# Patient Record
Sex: FEMALE | Race: OTHER | URBAN - METROPOLITAN AREA
[De-identification: names, ages, dates, MRNs, and addresses within clinical notes are randomized per-mention and may not be internally consistent; named-entity substitution may affect disease eponyms.]

---

## 2018-03-27 ENCOUNTER — EMERGENCY (EMERGENCY)
Facility: HOSPITAL | Age: 68
LOS: 1 days | Discharge: HOME | End: 2018-03-27
Attending: EMERGENCY MEDICINE | Admitting: EMERGENCY MEDICINE
Payer: MEDICARE

## 2018-03-27 VITALS
OXYGEN SATURATION: 100 % | DIASTOLIC BLOOD PRESSURE: 116 MMHG | RESPIRATION RATE: 18 BRPM | TEMPERATURE: 98 F | SYSTOLIC BLOOD PRESSURE: 178 MMHG | HEART RATE: 73 BPM

## 2018-03-27 VITALS
SYSTOLIC BLOOD PRESSURE: 147 MMHG | TEMPERATURE: 98 F | DIASTOLIC BLOOD PRESSURE: 84 MMHG | RESPIRATION RATE: 18 BRPM | OXYGEN SATURATION: 99 % | HEART RATE: 77 BPM

## 2018-03-27 DIAGNOSIS — I10 ESSENTIAL (PRIMARY) HYPERTENSION: ICD-10-CM

## 2018-03-27 LAB
ALBUMIN SERPL ELPH-MCNC: 3.7 G/DL — SIGNIFICANT CHANGE UP (ref 3.4–5)
ALP SERPL-CCNC: 97 U/L — SIGNIFICANT CHANGE UP (ref 40–120)
ALT FLD-CCNC: 24 U/L — SIGNIFICANT CHANGE UP (ref 12–42)
ANION GAP SERPL CALC-SCNC: 8 MMOL/L — LOW (ref 9–16)
APTT BLD: 32.9 SEC — SIGNIFICANT CHANGE UP (ref 27.5–36.5)
AST SERPL-CCNC: 36 U/L — SIGNIFICANT CHANGE UP (ref 15–37)
BASOPHILS NFR BLD AUTO: 1.1 % — SIGNIFICANT CHANGE UP (ref 0–2)
BILIRUB SERPL-MCNC: 0.4 MG/DL — SIGNIFICANT CHANGE UP (ref 0.2–1.2)
BUN SERPL-MCNC: 14 MG/DL — SIGNIFICANT CHANGE UP (ref 7–23)
CALCIUM SERPL-MCNC: 9.7 MG/DL — SIGNIFICANT CHANGE UP (ref 8.5–10.5)
CHLORIDE SERPL-SCNC: 105 MMOL/L — SIGNIFICANT CHANGE UP (ref 96–108)
CO2 SERPL-SCNC: 28 MMOL/L — SIGNIFICANT CHANGE UP (ref 22–31)
CREAT SERPL-MCNC: 0.87 MG/DL — SIGNIFICANT CHANGE UP (ref 0.5–1.3)
EOSINOPHIL NFR BLD AUTO: 5.2 % — SIGNIFICANT CHANGE UP (ref 0–6)
GLUCOSE SERPL-MCNC: 135 MG/DL — HIGH (ref 70–99)
HCT VFR BLD CALC: 44.6 % — SIGNIFICANT CHANGE UP (ref 34.5–45)
HGB BLD-MCNC: 14.4 G/DL — SIGNIFICANT CHANGE UP (ref 11.5–15.5)
IMM GRANULOCYTES NFR BLD AUTO: 0.5 % — SIGNIFICANT CHANGE UP (ref 0–1.5)
INR BLD: 1.03 — SIGNIFICANT CHANGE UP (ref 0.88–1.16)
LYMPHOCYTES # BLD AUTO: 25.3 % — SIGNIFICANT CHANGE UP (ref 13–44)
MCHC RBC-ENTMCNC: 30.6 PG — SIGNIFICANT CHANGE UP (ref 27–34)
MCHC RBC-ENTMCNC: 32.3 G/DL — SIGNIFICANT CHANGE UP (ref 32–36)
MCV RBC AUTO: 94.7 FL — SIGNIFICANT CHANGE UP (ref 80–100)
MONOCYTES NFR BLD AUTO: 9.3 % — SIGNIFICANT CHANGE UP (ref 2–14)
NEUTROPHILS NFR BLD AUTO: 58.6 % — SIGNIFICANT CHANGE UP (ref 43–77)
PLATELET # BLD AUTO: 280 K/UL — SIGNIFICANT CHANGE UP (ref 150–400)
POTASSIUM SERPL-MCNC: 4.9 MMOL/L — SIGNIFICANT CHANGE UP (ref 3.5–5.3)
POTASSIUM SERPL-SCNC: 4.9 MMOL/L — SIGNIFICANT CHANGE UP (ref 3.5–5.3)
PROT SERPL-MCNC: 7.8 G/DL — SIGNIFICANT CHANGE UP (ref 6.4–8.2)
PROTHROM AB SERPL-ACNC: 11.3 SEC — SIGNIFICANT CHANGE UP (ref 9.8–12.7)
RBC # BLD: 4.71 M/UL — SIGNIFICANT CHANGE UP (ref 3.8–5.2)
RBC # FLD: 13.4 % — SIGNIFICANT CHANGE UP (ref 10.3–16.9)
SODIUM SERPL-SCNC: 141 MMOL/L — SIGNIFICANT CHANGE UP (ref 132–145)
TROPONIN I SERPL-MCNC: <0.017 NG/ML — LOW (ref 0.02–0.06)
WBC # BLD: 6.4 K/UL — SIGNIFICANT CHANGE UP (ref 3.8–10.5)
WBC # FLD AUTO: 6.4 K/UL — SIGNIFICANT CHANGE UP (ref 3.8–10.5)

## 2018-03-27 PROCEDURE — 99284 EMERGENCY DEPT VISIT MOD MDM: CPT | Mod: 25

## 2018-03-27 PROCEDURE — 70450 CT HEAD/BRAIN W/O DYE: CPT | Mod: 26

## 2018-03-27 PROCEDURE — 93010 ELECTROCARDIOGRAM REPORT: CPT | Mod: 76

## 2018-03-27 RX ORDER — AMLODIPINE BESYLATE 2.5 MG/1
1 TABLET ORAL
Qty: 14 | Refills: 0 | OUTPATIENT
Start: 2018-03-27 | End: 2018-04-09

## 2018-03-27 RX ORDER — AMLODIPINE BESYLATE 2.5 MG/1
5 TABLET ORAL ONCE
Qty: 0 | Refills: 0 | Status: COMPLETED | OUTPATIENT
Start: 2018-03-27 | End: 2018-03-27

## 2018-03-27 RX ADMIN — AMLODIPINE BESYLATE 5 MILLIGRAM(S): 2.5 TABLET ORAL at 16:28

## 2018-03-27 NOTE — ED ADULT NURSE NOTE - OBJECTIVE STATEMENT
Pt sent from urgent care for further evaluation of headache and RUE &RLE pain x1day. Pt presents to ED hypertensive 178/116, clinically upgraded to MD [place]. Pt denies any blurred vision or disorientation, pt is A&Ox3, neuro/sensory intact, ambulating with steady gait on her own.

## 2018-03-27 NOTE — ED ADULT NURSE NOTE - CHPI ED SYMPTOMS NEG
no weakness/no blurred vision/no fever/no numbness/no loss of consciousness/no nausea/no change in level of consciousness/no vomiting

## 2018-03-27 NOTE — ED PROVIDER NOTE - MEDICAL DECISION MAKING DETAILS
HA - possibly due to a MSK origin of neck vs HTN.  Will treat HTN since pt was suppose to start medication anyway (name of medication unk).  Will start Norvasc.  Will perform EKG, labs, and HCT.     Pt does well.  BP comes down.  Notes that HA worsens with movement of her head.  Stable for d/c and strict instructions given to start BP medication and f/u with her PCP.  Family understands all d/c instructions and emergent return precautions.

## 2018-03-27 NOTE — ED PROVIDER NOTE - OBJECTIVE STATEMENT
Pt is a 68yo F with a h/o of HTN (recent dx and never started medication - unsure what medication) and Alzheimer's (on Aricept).  to note hx is given by patient's daughter.  She reports pt was USOH until about 1 weeks ago.  Pt now c/o b/l occipital HA - daily and persistent.  Improved somewhat with Tylenol but returns.  Associated with pain to RUE and RLE.  Reports pt with chronic R knee pain but arm issues are new.  Also reporting weakness 2/2 pain but no numbness.  +Nausea associated with HA but no vomiting.  No abd pain, CP, SOB, LE edema, or any other concerns.  She went to an outside urgent care for her sxs but sent here after noting an elevated BP and high K+ and Na+ on their lab work.  Also came with an EKG from  and appears normal without e/o hyperkalemia.

## 2018-03-27 NOTE — ED PROVIDER NOTE - PHYSICAL EXAMINATION
VITAL SIGNS: I have reviewed nursing notes and confirm.  CONSTITUTIONAL: Well-developed; well-nourished; in no acute distress.  SKIN: Skin is warm and dry, no acute rash.  HEAD: Normocephalic; atraumatic.  EYES: PERRL, EOM intact; conjunctiva and sclera clear.  ENT: No nasal discharge; airway clear.  MMM.   NECK: Supple; non tender.  CARD: S1, S2 normal; no murmurs, gallops, or rubs. Regular rate and rhythm.  RESP: No wheezes, rales or rhonchi.  ABD: Normal bowel sounds; soft; non-distended; non-tender; no hepatosplenomegaly.  EXT: Normal ROM. No clubbing, cyanosis or edema.  +2 pulses x 4.  No deformity or loss in ROM to all extremities.   NEURO: Alert, oriented x 2 (unsure of year). Grossly unremarkable - CN 2-12 intact, motor 5/5 throughout without drift to any extremity.  SILT throughout.  Finger to nose intact.    PSYCH: Cooperative, appropriate.

## 2019-08-12 NOTE — ED ADULT NURSE NOTE - RN DISCHARGE SIGNATURE
[FreeTextEntry1] : Labs will be sent out/further recommendations will be made based on lab results. Patient advised to continue present medications with diet/exercise and specialist followup. Patient will return to the office in feb for complete physical\par \par \par no need for mammogram\par Dismissed from oncology\par Thyroid sonogram =9/2018=no change since 2014\par Bone density=10/2017-rheum to do followup in dec per pt\par Colonoscopy was 8/2018 with followup in 5 years\par Declines GYN exam\par Discussed shingrix\par specialists include\par 1. pulmonary [place] [facility] prn\par 2.rheumatology [place] [facility]\par 3.ENT-Dr. Baez-prn\par 4.neurology-Dr. Murguia\par 5.podiatry-Dr. Plaza in [place]\par 6.GI-Dr. tom\par 7.Allergist-Dr. Pena prn\par Hepatitis C screening=9/2018\par CA 27.29=2/2019 27-Mar-2018

## 2021-05-27 ENCOUNTER — APPOINTMENT (OUTPATIENT)
Dept: VASCULAR SURGERY | Facility: CLINIC | Age: 71
End: 2021-05-27

## 2021-05-27 PROBLEM — Z00.00 ENCOUNTER FOR PREVENTIVE HEALTH EXAMINATION: Status: ACTIVE | Noted: 2021-05-27

## 2024-01-29 NOTE — ED ADULT NURSE NOTE - RESPIRATORY WDL
[place]
Breathing spontaneous and unlabored. Breath sounds clear and equal bilaterally with regular rhythm.